# Patient Record
Sex: FEMALE | Race: NATIVE HAWAIIAN OR OTHER PACIFIC ISLANDER
[De-identification: names, ages, dates, MRNs, and addresses within clinical notes are randomized per-mention and may not be internally consistent; named-entity substitution may affect disease eponyms.]

---

## 2019-01-16 ENCOUNTER — HOSPITAL ENCOUNTER (EMERGENCY)
Dept: HOSPITAL 31 - C.ER | Age: 74
Discharge: HOME | End: 2019-01-16
Payer: COMMERCIAL

## 2019-01-16 VITALS
RESPIRATION RATE: 18 BRPM | DIASTOLIC BLOOD PRESSURE: 76 MMHG | OXYGEN SATURATION: 100 % | HEART RATE: 80 BPM | SYSTOLIC BLOOD PRESSURE: 148 MMHG | TEMPERATURE: 98.1 F

## 2019-01-16 DIAGNOSIS — J02.9: Primary | ICD-10-CM

## 2019-01-16 NOTE — C.PDOC
Time Seen by Provider: 01/16/19 11:16


Chief Complaint (Nursing): ENT Problem


History Per: Patient


Onset/Duration Of Symptoms: Days (2)


Current Symptoms Are (Timing): Still Present


Associated Symptoms: Sore Throat


Severity: Moderate


Additional History Per: Prior Records





Past Medical History


Reviewed: Historical Data, Nursing Documentation, Vital Signs


Vital Signs: 





                                Last Vital Signs











Temp  98.1 F   01/16/19 11:08


 


Pulse  80   01/16/19 11:08


 


Resp  18   01/16/19 11:08


 


BP  148/76   01/16/19 11:08


 


Pulse Ox  100   01/16/19 11:08














- Medical History


PMH: Diabetes, HTN, Hypercholesterolemia


Family History: States: Unknown Family Hx





- Social History


Hx Tobacco Use: No


Hx Alcohol Use: No


Hx Substance Use: No





- Immunization History


Hx Tetanus Toxoid Vaccination: No


Hx Influenza Vaccination: Yes


Hx Pneumococcal Vaccination: No





Review Of Systems


Except As Marked, All Systems Reviewed And Found Negative.


Constitutional: Negative for: Fever


ENT: Positive for: Throat Pain


Cardiovascular: Negative for: Chest Pain


Respiratory: Negative for: Cough, Shortness of Breath


Gastrointestinal: Negative for: Vomiting, Abdominal Pain


Musculoskeletal: Negative for: Neck Pain, Back Pain


Skin: Negative for: Rash


Neurological: Negative for: Weakness, Numbness





Physical Exam





- Physical Exam


Appears: Non-toxic, No Acute Distress


Skin: Normal Color, Warm, Dry, No Rash


Head: Atraumatic, Normacephalic


Eye(s): bilateral: PERRL, EOMI


Oral Mucosa: Moist, No Drooling, No Trismus


Throat: Erythema, No Exudate, No Drooling, No Mass


Neck: Normal ROM, Supple


Cardiovascular: Rhythm Regular


Respiratory: Normal Breath Sounds, No Accessory Muscle Use


Gastrointestinal/Abdominal: Soft, No Tenderness


Extremity: Normal ROM


Neurological/Psych: Oriented x3, Normal Speech, Normal Motor





ED Course And Treatment





- Laboratory Results


Interpretation Of Abnormal: Rapid strep negative.


O2 Sat by Pulse Oximetry: 100


Pulse Ox Interpretation: Normal





Disposition


Counseled Patient/Family Regarding: Studies Performed, Diagnosis, Need For 

Followup





- Disposition


Referrals: 


Bee Hitchcock MD [Staff Provider] - 


Disposition: HOME/ ROUTINE


Disposition Time: 12:20


Condition: STABLE


Additional Instructions: 


Follow up with your doctor. Return to the ER if you develop high fever, trouble 

breathing or swallowing, worsening of symptoms or have any other concerns. 


Instructions:  Viral Pharyngitis  (DC)





- Clinical Impression


Clinical Impression: 


 Acute pharyngitis

## 2019-04-01 ENCOUNTER — HOSPITAL ENCOUNTER (OUTPATIENT)
Dept: HOSPITAL 31 - C.LAB | Age: 74
End: 2019-04-01
Payer: COMMERCIAL

## 2019-04-01 DIAGNOSIS — E11.65: Primary | ICD-10-CM

## 2019-04-01 DIAGNOSIS — E78.00: ICD-10-CM

## 2019-04-01 DIAGNOSIS — E55.9: ICD-10-CM
